# Patient Record
Sex: FEMALE | Race: BLACK OR AFRICAN AMERICAN | NOT HISPANIC OR LATINO | URBAN - METROPOLITAN AREA
[De-identification: names, ages, dates, MRNs, and addresses within clinical notes are randomized per-mention and may not be internally consistent; named-entity substitution may affect disease eponyms.]

---

## 2017-04-10 ENCOUNTER — IMPORTED ENCOUNTER (OUTPATIENT)
Dept: URBAN - METROPOLITAN AREA CLINIC 9 | Facility: CLINIC | Age: 82
End: 2017-04-10

## 2017-04-26 ENCOUNTER — IMPORTED ENCOUNTER (OUTPATIENT)
Dept: URBAN - METROPOLITAN AREA CLINIC 9 | Facility: CLINIC | Age: 82
End: 2017-04-26

## 2017-05-02 ENCOUNTER — IMPORTED ENCOUNTER (OUTPATIENT)
Dept: URBAN - METROPOLITAN AREA CLINIC 9 | Facility: CLINIC | Age: 82
End: 2017-05-02

## 2017-07-25 NOTE — PATIENT DISCUSSION
VITREOUS FLOATERS, OD:  NO HOLES OR TEARS 360' OU. FLOATERS AND FLASHES PRECAUTIONS REVIEWED WITH PATIENT. PATIENT TO  RETURN FOR FOLLOW-UP AS SCHEDULED.

## 2017-07-25 NOTE — PATIENT DISCUSSION
CONJUNCTIVITIS (ALLERGIC), OU:  ASYMPTOMATIC TODAY. CONTINUE ZADITOR BID OU AS NEEDED DURING ALLERGY SEASON FOR ITCH RELIEF. RETURN FOR FOLLOW-UP AS SCHEDULED.

## 2017-07-25 NOTE — PATIENT DISCUSSION
RX CHANGE TODAY. TRIALS ORDERED. PATIENT TO WEAR TRIALS FOR AT LEAST 1 WEEK PRIOR TO ORDERING SUPPLY OF LENSES AND RETURN IF EXPERIENCING ANY PROBLEMS WITH NEW RX. FOLLOW-UP AS SCHEDULED.

## 2018-06-18 ENCOUNTER — IMPORTED ENCOUNTER (OUTPATIENT)
Dept: URBAN - METROPOLITAN AREA CLINIC 9 | Facility: CLINIC | Age: 83
End: 2018-06-18

## 2018-09-05 NOTE — PATIENT DISCUSSION
[facility]/2018[id number] +&nbsp;SN &nbsp; &nbsp; tnm

## 2018-09-05 NOTE — PATIENT DISCUSSION
CONJUNCTIVITIS (ALLERGIC), OU:  PRESCRIBE ______________________. RETURN FOR FOLLOW-UP AS SCHEDULED.

## 2018-09-05 NOTE — PATIENT DISCUSSION
VITREOUS FLOATER, OD:  NO HOLES OR TEARS 360' OU. FLOATERS AND FLASHES PRECAUTIONS REVIEWED WITH PATIENT. PATIENT TO  RETURN FOR FOLLOW-UP AS SCHEDULED.

## 2018-12-10 ENCOUNTER — IMPORTED ENCOUNTER (OUTPATIENT)
Dept: URBAN - METROPOLITAN AREA CLINIC 9 | Facility: CLINIC | Age: 83
End: 2018-12-10

## 2018-12-19 ENCOUNTER — IMPORTED ENCOUNTER (OUTPATIENT)
Dept: URBAN - METROPOLITAN AREA CLINIC 9 | Facility: CLINIC | Age: 83
End: 2018-12-19

## 2018-12-24 ENCOUNTER — IMPORTED ENCOUNTER (OUTPATIENT)
Dept: URBAN - METROPOLITAN AREA CLINIC 9 | Facility: CLINIC | Age: 83
End: 2018-12-24

## 2019-06-10 ENCOUNTER — IMPORTED ENCOUNTER (OUTPATIENT)
Dept: URBAN - METROPOLITAN AREA CLINIC 9 | Facility: CLINIC | Age: 84
End: 2019-06-10

## 2020-04-02 ENCOUNTER — IMPORTED ENCOUNTER (OUTPATIENT)
Dept: URBAN - METROPOLITAN AREA CLINIC 9 | Facility: CLINIC | Age: 85
End: 2020-04-02

## 2020-10-07 ENCOUNTER — IMPORTED ENCOUNTER (OUTPATIENT)
Dept: URBAN - METROPOLITAN AREA CLINIC 9 | Facility: CLINIC | Age: 85
End: 2020-10-07

## 2020-10-07 PROBLEM — H04.123: Noted: 2020-10-07

## 2020-10-07 PROBLEM — H35.3131: Noted: 2020-10-07

## 2020-10-07 PROBLEM — H04.121: Noted: 2020-10-07

## 2021-04-13 NOTE — PATIENT DISCUSSION
MYOPIA/ASTIGMATISM OU: UPDATED SRX RELEASED TO PATIENT. ORDERED CL TRIALS - RTC FOR CL CHECK/FIT. DISCUSSED PROPER WEAR/CARE/HYGIENE OF CLS. RTC IF DISCOMFORT. MONITOR.

## 2021-04-13 NOTE — PATIENT DISCUSSION
ALLERGIC CONJUNCTIVITIS OU: PATIENT ASYMPTOMATIC. RECOMMENDED OTC PATADAY BID OU IF SI/SX ARISE. MONITOR.

## 2021-10-15 ASSESSMENT — TONOMETRY
OD_IOP_MMHG: 14
OS_IOP_MMHG: 22
OS_IOP_MMHG: 15
OD_IOP_MMHG: 15
OS_IOP_MMHG: 15
OS_IOP_MMHG: 18
OS_IOP_MMHG: 13
OD_IOP_MMHG: 13
OD_IOP_MMHG: 17
OD_IOP_MMHG: 16
OD_IOP_MMHG: 23
OS_IOP_MMHG: 16
OD_IOP_MMHG: 15
OS_IOP_MMHG: 12
OS_IOP_MMHG: 11

## 2021-10-15 ASSESSMENT — VISUAL ACUITY
OS_SC: 20/30 SN
OS_SC: 20/30 - SN
OS_SC: 20/30 + SN
OD_SC: 20/30 + SN
OS_SC: 20/30 - SN
OS_SC: 20/40 SN
OD_SC: 20/25 - SN
OD_CC: 20/25 SN
OD_SC: 20/30 SN
OD_SC: 20/30 + SN
OD_CC: 20/25 SN
OD_CC: 20/20 SN
OS_CC: 20/25 - SN
OD_CC: 20/25 SN
OS_CC: 20/25 - SN
OD_SC: 20/30 - SN
OD_CC: 20/40 -2 SN
OS_CC: 20/20 SN
OS_SC: 20/20 SN
OD_CC: 20/20 -2 SN
OD_SC: 20/60 + SN
OS_CC: 20/20 - SN
OS_SC: 20/40 + SN
OS_CC: 20/20 SN
OS_CC: 20/25 SN
OD_SC: 20/25 -2 SN
OS_SC: 20/25 -2 SN

## 2021-10-15 ASSESSMENT — KERATOMETRY
OS_AXISANGLE2_DEGREES: 25
OS_K1POWER_DIOPTERS: 43.5
OS_K2POWER_DIOPTERS: 44.75
OD_AXISANGLE_DEGREES: 48
OS_AXISANGLE_DEGREES: 102
OD_AXISANGLE2_DEGREES: 138
OS_K1POWER_DIOPTERS: 44
OS_K2POWER_DIOPTERS: 44.5
OD_AXISANGLE_DEGREES: 58
OS_K2POWER_DIOPTERS: 44.5
OS_K1POWER_DIOPTERS: 44.25
OS_AXISANGLE2_DEGREES: 51
OD_AXISANGLE2_DEGREES: 152
OD_K2POWER_DIOPTERS: 45.75
OD_K1POWER_DIOPTERS: 43.5
OD_AXISANGLE2_DEGREES: 118
OD_K2POWER_DIOPTERS: 44.75
OD_AXISANGLE_DEGREES: 28
OS_AXISANGLE_DEGREES: 141
OD_AXISANGLE2_DEGREES: 148
OS_K2POWER_DIOPTERS: 44.75
OS_AXISANGLE2_DEGREES: 27
OS_AXISANGLE_DEGREES: 156
OS_AXISANGLE_DEGREES: 115
OD_K2POWER_DIOPTERS: 44.5
OS_AXISANGLE_DEGREES: 117
OS_AXISANGLE2_DEGREES: 12
OS_K1POWER_DIOPTERS: 44.25
OD_K1POWER_DIOPTERS: 44
OD_AXISANGLE_DEGREES: 62
OS_K2POWER_DIOPTERS: 44.25
OD_K1POWER_DIOPTERS: 43.75
OS_AXISANGLE2_DEGREES: 66
OD_K2POWER_DIOPTERS: 44.5
OS_K1POWER_DIOPTERS: 44
OD_K1POWER_DIOPTERS: 43.75

## 2022-03-31 ENCOUNTER — ESTABLISHED PATIENT (OUTPATIENT)
Dept: URBAN - NONMETROPOLITAN AREA CLINIC 6 | Facility: CLINIC | Age: 87
End: 2022-03-31

## 2022-03-31 DIAGNOSIS — H04.121: ICD-10-CM

## 2022-03-31 DIAGNOSIS — H04.122: ICD-10-CM

## 2022-03-31 PROCEDURE — 99213 OFFICE O/P EST LOW 20 MIN: CPT

## 2022-03-31 ASSESSMENT — TONOMETRY
OS_IOP_MMHG: 14
OD_IOP_MMHG: 15

## 2022-03-31 ASSESSMENT — VISUAL ACUITY
OS_SC: 20/30
OD_SC: 20/25
OU_SC: 20/30

## 2022-04-29 ENCOUNTER — ESTABLISHED PATIENT (OUTPATIENT)
Dept: URBAN - NONMETROPOLITAN AREA CLINIC 6 | Facility: CLINIC | Age: 87
End: 2022-04-29

## 2022-04-29 DIAGNOSIS — Z96.1: ICD-10-CM

## 2022-04-29 DIAGNOSIS — H40.013: ICD-10-CM

## 2022-04-29 DIAGNOSIS — H02.413: ICD-10-CM

## 2022-04-29 DIAGNOSIS — H04.123: ICD-10-CM

## 2022-04-29 PROCEDURE — 92014 COMPRE OPH EXAM EST PT 1/>: CPT

## 2022-04-29 PROCEDURE — 68761 CLOSE TEAR DUCT OPENING: CPT

## 2022-04-29 PROCEDURE — 92133 CPTRZD OPH DX IMG PST SGM ON: CPT

## 2022-04-29 ASSESSMENT — VISUAL ACUITY
OS_SC: 20/30-2
OD_SC: 20/25-2
OU_SC: 20/25+1

## 2022-04-29 ASSESSMENT — TONOMETRY
OS_IOP_MMHG: 12
OD_IOP_MMHG: 18

## 2022-10-31 ENCOUNTER — FOLLOW UP (OUTPATIENT)
Dept: URBAN - NONMETROPOLITAN AREA CLINIC 6 | Facility: CLINIC | Age: 87
End: 2022-10-31

## 2022-10-31 DIAGNOSIS — H04.123: ICD-10-CM

## 2022-10-31 PROCEDURE — 92012 INTRM OPH EXAM EST PATIENT: CPT

## 2022-10-31 PROCEDURE — 68761 CLOSE TEAR DUCT OPENING: CPT

## 2022-10-31 ASSESSMENT — VISUAL ACUITY
OS_SC: 20/40
OD_SC: 20/40
OU_SC: 20/30-2

## 2022-10-31 ASSESSMENT — TONOMETRY
OD_IOP_MMHG: 20
OS_IOP_MMHG: 20

## 2023-07-20 ENCOUNTER — COMPREHENSIVE EXAM (OUTPATIENT)
Dept: URBAN - NONMETROPOLITAN AREA CLINIC 6 | Facility: CLINIC | Age: 88
End: 2023-07-20

## 2023-07-20 DIAGNOSIS — H52.223: ICD-10-CM

## 2023-07-20 DIAGNOSIS — H40.013: ICD-10-CM

## 2023-07-20 PROCEDURE — 92015 DETERMINE REFRACTIVE STATE: CPT

## 2023-07-20 PROCEDURE — 92014 COMPRE OPH EXAM EST PT 1/>: CPT

## 2023-07-20 PROCEDURE — 92133 CPTRZD OPH DX IMG PST SGM ON: CPT

## 2023-07-20 ASSESSMENT — TONOMETRY
OS_IOP_MMHG: 12
OD_IOP_MMHG: 10
OS_IOP_MMHG: 12
OD_IOP_MMHG: 14

## 2023-07-20 ASSESSMENT — KERATOMETRY
OS_K1POWER_DIOPTERS: 44.00
OS_AXISANGLE2_DEGREES: 36
OS_AXISANGLE_DEGREES: 126
OS_K2POWER_DIOPTERS: 44.75
OD_AXISANGLE_DEGREES: 60
OD_K1POWER_DIOPTERS: 44.00
OD_AXISANGLE2_DEGREES: 150
OD_K2POWER_DIOPTERS: 45.00

## 2023-07-20 ASSESSMENT — VISUAL ACUITY
OS_SC: 20/40-2
OD_SC: 20/25
OU_SC: 20/40